# Patient Record
Sex: FEMALE | Employment: UNEMPLOYED | URBAN - METROPOLITAN AREA
[De-identification: names, ages, dates, MRNs, and addresses within clinical notes are randomized per-mention and may not be internally consistent; named-entity substitution may affect disease eponyms.]

---

## 2017-05-17 ENCOUNTER — TELEPHONE (OUTPATIENT)
Dept: PEDIATRICS CLINIC | Age: 22
End: 2017-05-17

## 2017-05-17 NOTE — TELEPHONE ENCOUNTER
----- Message from [place] sent at 5/15/2017  2:24 PM EDT -----  Regarding: /Telephone  Pt is requesting a copy of her immunization record. Pt would like records sent to her [employer] which is [employer] fax number  [phone] (attn:Neto Dang). Pt best contact number is [phone].